# Patient Record
Sex: MALE | Race: WHITE | NOT HISPANIC OR LATINO | Employment: FULL TIME | URBAN - METROPOLITAN AREA
[De-identification: names, ages, dates, MRNs, and addresses within clinical notes are randomized per-mention and may not be internally consistent; named-entity substitution may affect disease eponyms.]

---

## 2019-06-21 ENCOUNTER — HOSPITAL ENCOUNTER (EMERGENCY)
Facility: HOSPITAL | Age: 54
Discharge: HOME | End: 2019-06-21
Attending: EMERGENCY MEDICINE

## 2019-06-21 VITALS
BODY MASS INDEX: 35.55 KG/M2 | TEMPERATURE: 98 F | OXYGEN SATURATION: 98 % | HEART RATE: 84 BPM | HEIGHT: 72 IN | DIASTOLIC BLOOD PRESSURE: 94 MMHG | RESPIRATION RATE: 18 BRPM | WEIGHT: 262.44 LBS | SYSTOLIC BLOOD PRESSURE: 130 MMHG

## 2019-06-21 DIAGNOSIS — Z76.0 MEDICATION REFILL: ICD-10-CM

## 2019-06-21 DIAGNOSIS — M1A.0710 CHRONIC GOUT OF RIGHT FOOT, UNSPECIFIED CAUSE: Primary | ICD-10-CM

## 2019-06-21 PROCEDURE — 63600175 PHARM REV CODE 636 W HCPCS: Performed by: REGISTERED NURSE

## 2019-06-21 PROCEDURE — 99284 EMERGENCY DEPT VISIT MOD MDM: CPT | Mod: 25

## 2019-06-21 PROCEDURE — 96372 THER/PROPH/DIAG INJ SC/IM: CPT

## 2019-06-21 RX ORDER — INDOMETHACIN 50 MG/1
CAPSULE ORAL
COMMUNITY

## 2019-06-21 RX ORDER — DEXAMETHASONE SODIUM PHOSPHATE 4 MG/ML
8 INJECTION, SOLUTION INTRA-ARTICULAR; INTRALESIONAL; INTRAMUSCULAR; INTRAVENOUS; SOFT TISSUE
Status: COMPLETED | OUTPATIENT
Start: 2019-06-21 | End: 2019-06-21

## 2019-06-21 RX ORDER — INDOMETHACIN 50 MG/1
50 CAPSULE ORAL DAILY
Qty: 30 CAPSULE | Refills: 1 | Status: SHIPPED | OUTPATIENT
Start: 2019-06-21

## 2019-06-21 RX ADMIN — DEXAMETHASONE SODIUM PHOSPHATE 8 MG: 4 INJECTION, SOLUTION INTRA-ARTICULAR; INTRALESIONAL; INTRAMUSCULAR; INTRAVENOUS; SOFT TISSUE at 12:06

## 2019-06-21 NOTE — ED PROVIDER NOTES
"     HISTORY     Chief Complaint   Patient presents with    Gout     " my gout is acting up and I ran out of my medicine"     Review of patient's allergies indicates:   Allergen Reactions    Codeine Hives    Hydrocodone Hives        HPI   The history is provided by the patient.   Gout attack to R foot, pt states that he ran out of his medication. Denies any fever, NVD, leg swelling, CP, SOB or any other symptoms at this time.      PCP: Primary Doctor No     Past Medical History:  History reviewed. No pertinent past medical history.     Past Surgical History:  Past Surgical History:   Procedure Laterality Date    HAND SURGERY      lipoma removal          Family History:  History reviewed. No pertinent family history.     Social History:  Social History     Tobacco Use    Smoking status: Never Smoker    Smokeless tobacco: Current User     Types: Chew   Substance and Sexual Activity    Alcohol use: No    Drug use: No    Sexual activity: Not on file         ROS   Review of Systems   Constitutional: Negative for fever.   HENT: Negative for sore throat.    Respiratory: Negative for shortness of breath.    Cardiovascular: Negative for chest pain.   Gastrointestinal: Negative for nausea.   Genitourinary: Negative for dysuria.   Musculoskeletal: Negative for back pain.        + R foot pain    Skin: Negative for rash.   Neurological: Negative for weakness.   Hematological: Does not bruise/bleed easily.   All other systems reviewed and are negative.      PHYSICAL EXAM     Initial Vitals   BP Pulse Resp Temp SpO2   06/21/19 1217 06/21/19 1217 06/21/19 1217 06/21/19 1219 06/21/19 1217   139/84 85 18 97.5 °F (36.4 °C) 98 %      MAP       --                  Physical Exam    Constitutional: He appears well-developed and well-nourished. No distress.   HENT:   Head: Normocephalic and atraumatic.   Nose: Nose normal.   Mouth/Throat: Oropharynx is clear and moist.   Eyes: Conjunctivae and EOM are normal. Pupils are equal, " round, and reactive to light.   Neck: Normal range of motion. Neck supple.   Cardiovascular: Normal rate and regular rhythm.   Pulmonary/Chest: Effort normal and breath sounds normal. No respiratory distress. He has no decreased breath sounds. He has no wheezes. He has no rales.   Abdominal: Soft. Normal appearance and bowel sounds are normal. There is no tenderness.   Musculoskeletal: Normal range of motion.        Right foot: There is tenderness.        Feet:    Erythema, warm and ttp over base of 2-3 toes on R foot, hx of gout to area   Neurological: He is alert and oriented to person, place, and time. He has normal strength. GCS eye subscore is 4. GCS verbal subscore is 5. GCS motor subscore is 6.   Skin: Skin is warm and dry. Capillary refill takes less than 2 seconds. No rash noted.   Psychiatric: He has a normal mood and affect. His speech is normal and behavior is normal.          ED COURSE   Procedures  ED ONGOING VITALS:  Vitals:    06/21/19 1217 06/21/19 1219 06/21/19 1305   BP: 139/84  (!) 130/94   Pulse: 85  84   Resp: 18  18   Temp:  97.5 °F (36.4 °C)    TempSrc: Oral Axillary    SpO2: 98%  98%   Weight: 119 kg (262 lb 7.3 oz)     Height: 6' (1.829 m)           ABNORMAL LAB VALUES:  Labs Reviewed - No data to display      ALL LAB VALUES:        RADIOLOGY STUDIES:  Imaging Results    None                   The above vital signs and test results have been reviewed by the emergency provider.     ED Medications:  Medications   dexamethasone injection 8 mg (8 mg Intramuscular Given 6/21/19 1259)         Discharge Medications:  Discharge Medication List as of 6/21/2019 12:42 PM      START taking these medications    Details   indomethacin (INDOCIN) 50 MG capsule Take 1 capsule (50 mg total) by mouth once daily., Starting Fri 6/21/2019, Print            Follow-up Information     [facility]    Specialty:  Emergency Medicine  Why:  If symptoms worsen  Contact information:  [address] [place] [address]  [place]  [facility] [address]  [phone]               12:42 PM: Discussed pt dx and plan of tx. Gave pt all f/u and return to the ED instructions. All questions and concerns were addressed at this time. Pt expresses understanding of information and instructions, and is comfortable with plan to discharge. Pt is stable for discharge.       I discussed with patient and/or family/caretaker that evaluation in the ED does not suggest any emergent or life threatening medical conditions requiring immediate intervention beyond what was provided in the ED, and I believe patient is safe for discharge. Regardless, an unremarkable evaluation in the ED does not preclude the development or presence of a serious or life threatening condition. As such, patient was instructed to return immediately for any worsening or change in current symptoms.    MEDICAL DECISION MAKING                 CLINICAL IMPRESSION       ICD-10-CM ICD-9-CM   1. Chronic gout of right foot, unspecified cause M1A.0710 274.02   2. Medication refill Z76.0 V68.1               Jony Miller Jr., [facility]  06/21/19 2037

## 2019-06-21 NOTE — ED NOTES
"Pt c/o 7/10 right toe pain x 30 days. Pt states, "I ran out of my gout medicine."     Patient identifiers verified and correct for Gm Gold Martins.    LOC: The patient is awake, alert and aware of environment with an appropriate affect, the patient is oriented x 3 and speaking appropriately.  APPEARANCE: Patient resting comfortably and in no acute distress, patient is clean and well groomed, patient's clothing is properly fastened.  SKIN: The skin is warm and dry, color consistent with ethnicity, patient has normal skin turgor and moist mucus membranes, skin intact, no breakdown or bruising noted.  MUSCULOSKELETAL: Patient moving all extremities spontaneously. Right toes tender to touch.   RESPIRATORY: Airway is open and patent, respirations are spontaneous.  CARDIAC: Patient has a normal rate, no periphreal edema noted, capillary refill < 3 seconds.  ABDOMEN: Soft and non tender to palpation.    "

## 2023-11-09 ENCOUNTER — OFFICE VISIT (OUTPATIENT)
Dept: URGENT CARE | Facility: CLINIC | Age: 58
End: 2023-11-09
Payer: COMMERCIAL

## 2023-11-09 VITALS
SYSTOLIC BLOOD PRESSURE: 122 MMHG | BODY MASS INDEX: 35.89 KG/M2 | WEIGHT: 265 LBS | RESPIRATION RATE: 20 BRPM | DIASTOLIC BLOOD PRESSURE: 70 MMHG | HEART RATE: 78 BPM | HEIGHT: 72 IN | TEMPERATURE: 98 F | OXYGEN SATURATION: 98 %

## 2023-11-09 DIAGNOSIS — Z87.39 HISTORY OF GOUT: ICD-10-CM

## 2023-11-09 DIAGNOSIS — M25.531 RIGHT WRIST PAIN: Primary | ICD-10-CM

## 2023-11-09 PROCEDURE — 96372 THER/PROPH/DIAG INJ SC/IM: CPT | Mod: S$GLB,,, | Performed by: FAMILY MEDICINE

## 2023-11-09 PROCEDURE — 96372 PR INJECTION,THERAP/PROPH/DIAG2ST, IM OR SUBCUT: ICD-10-PCS | Mod: S$GLB,,, | Performed by: FAMILY MEDICINE

## 2023-11-09 PROCEDURE — 99203 PR OFFICE/OUTPT VISIT, NEW, LEVL III, 30-44 MIN: ICD-10-PCS | Mod: 25,S$GLB,, | Performed by: PHYSICIAN ASSISTANT

## 2023-11-09 PROCEDURE — 99203 OFFICE O/P NEW LOW 30 MIN: CPT | Mod: 25,S$GLB,, | Performed by: PHYSICIAN ASSISTANT

## 2023-11-09 RX ORDER — PREDNISONE 20 MG/1
40 TABLET ORAL
Status: COMPLETED | OUTPATIENT
Start: 2023-11-09 | End: 2023-11-09

## 2023-11-09 RX ORDER — DEXAMETHASONE SODIUM PHOSPHATE 100 MG/10ML
10 INJECTION INTRAMUSCULAR; INTRAVENOUS ONCE
Status: COMPLETED | OUTPATIENT
Start: 2023-11-09 | End: 2023-11-09

## 2023-11-09 RX ORDER — KETOROLAC TROMETHAMINE 30 MG/ML
30 INJECTION, SOLUTION INTRAMUSCULAR; INTRAVENOUS
Status: COMPLETED | OUTPATIENT
Start: 2023-11-09 | End: 2023-11-09

## 2023-11-09 RX ORDER — INDOMETHACIN 75 MG/1
75 CAPSULE, EXTENDED RELEASE ORAL 2 TIMES DAILY WITH MEALS
Qty: 20 CAPSULE | Refills: 0 | OUTPATIENT
Start: 2023-11-09 | End: 2023-11-12

## 2023-11-09 RX ADMIN — DEXAMETHASONE SODIUM PHOSPHATE 10 MG: 100 INJECTION INTRAMUSCULAR; INTRAVENOUS at 05:11

## 2023-11-09 RX ADMIN — KETOROLAC TROMETHAMINE 30 MG: 30 INJECTION, SOLUTION INTRAMUSCULAR; INTRAVENOUS at 05:11

## 2023-11-09 RX ADMIN — PREDNISONE 40 MG: 20 TABLET ORAL at 05:11

## 2023-11-09 NOTE — PROGRESS NOTES
Subjective:      Patient ID: Gm Martins is a 58 y.o. male.    Vitals:  height is 6' (1.829 m) and weight is 120.2 kg (265 lb). His oral temperature is 98.1 °F (36.7 °C). His blood pressure is 122/70 and his pulse is 78. His respiration is 20 and oxygen saturation is 98%.     Chief Complaint: Hand Pain    Pt have a history of gout with his right foot, and  when he woke up this morning his right hand was swollen.Pt has pain right hand, and never had an injury to his right hand. Pt have concerns of his right hand now has gout.  He denies injury or trauma to right hand.    Hand Pain   The incident occurred 12 to 24 hours ago. The injury mechanism is unknown. The pain is present in the right hand. The quality of the pain is described as burning. Radiates to: Right elbow. The pain is at a severity of 10/10. The pain is severe. The pain has been Constant since the incident. Pertinent negatives include no chest pain, muscle weakness, numbness or tingling. The symptoms are aggravated by movement and lifting. Treatments tried: gout meds. The treatment provided no relief.       Cardiovascular:  Negative for chest pain.   Musculoskeletal:  Positive for pain, joint pain, joint swelling and gout.   Neurological:  Negative for numbness.      Objective:     Physical Exam   Constitutional: He is oriented to person, place, and time. He appears well-developed.   HENT:   Head: Normocephalic and atraumatic.   Ears:   Right Ear: External ear normal.   Left Ear: External ear normal.   Nose: Nose normal.   Mouth/Throat: Oropharynx is clear and moist.   Eyes: Conjunctivae, EOM and lids are normal. Pupils are equal, round, and reactive to light.   Neck: Trachea normal and phonation normal. Neck supple.   Cardiovascular: Normal rate.   Musculoskeletal: Normal range of motion.         General: Swelling and tenderness present. Normal range of motion.      Comments: Entire right hand is slightly swollen with most swelling at the ulnar  wrist portion.  In his mostly tender over the dorsum on a aspect of the hand  Distal sensory motor vascular intact.  Hand is only slightly warm.  There was no tracking up the arm erythema streaking.  There is no axillary adenopathy.   Neurological: He is alert and oriented to person, place, and time.   Skin: Skin is warm, dry and intact.   Psychiatric: His speech is normal and behavior is normal. Judgment and thought content normal.   Nursing note and vitals reviewed.  PATIENT COMES IN WITH A HISTORY OF GOUT RIGHT HAND/WRIST SWELLING VERY TENDER TO PALPATION CONSISTENT WITH GOUT.  THERE ARE NO OPEN SKIN LESIONS OR OTHER FINDINGS THAT COULD BE THE  CAUSE OF CELLULITIS BUT I AM % CERTAIN THIS IS GOUT ONLY.  I WILL TREAT HIM AS GOUT TONIGHT AND HE UNDERSTANDS TO RETURN TOMORROW IF SYMPTOMS WORSENING AT WHICH TIME ANTIBIOTICS WILL NEED TO BE PRESCRIBED    Assessment:     1. Right wrist pain    2. History of gout        Plan:       Right wrist pain  -     dexAMETHasone injection 10 mg  -     ketorolac injection 30 mg  -     predniSONE tablet 40 mg  -     indomethacin (INDOCIN SR) 75 mg CpSR CR capsule; Take 1 capsule (75 mg total) by mouth 2 (two) times daily with meals.  Dispense: 20 capsule; Refill: 0    History of gout  -     dexAMETHasone injection 10 mg  -     ketorolac injection 30 mg  -     predniSONE tablet 40 mg  -     indomethacin (INDOCIN SR) 75 mg CpSR CR capsule; Take 1 capsule (75 mg total) by mouth 2 (two) times daily with meals.  Dispense: 20 capsule; Refill: 0      Follow up if symptoms worsen or fail to improve, for F/U with PCP or ED.   Patient Instructions   RETURN TOMORROW MORNING IF HAND/ARM WERE MORE RED OR SWELLING IS WORSE OR PAIN IS WORSE

## 2023-11-12 ENCOUNTER — HOSPITAL ENCOUNTER (EMERGENCY)
Facility: HOSPITAL | Age: 58
Discharge: HOME | End: 2023-11-12
Attending: EMERGENCY MEDICINE
Payer: COMMERCIAL

## 2023-11-12 VITALS
WEIGHT: 282.88 LBS | TEMPERATURE: 98 F | HEART RATE: 75 BPM | OXYGEN SATURATION: 96 % | SYSTOLIC BLOOD PRESSURE: 155 MMHG | BODY MASS INDEX: 38.36 KG/M2 | RESPIRATION RATE: 20 BRPM | DIASTOLIC BLOOD PRESSURE: 90 MMHG

## 2023-11-12 DIAGNOSIS — Z87.39 HISTORY OF GOUT: ICD-10-CM

## 2023-11-12 DIAGNOSIS — M25.531 RIGHT WRIST PAIN: ICD-10-CM

## 2023-11-12 DIAGNOSIS — S69.91XA RIGHT WRIST INJURY: ICD-10-CM

## 2023-11-12 DIAGNOSIS — M25.531 WRIST PAIN, ACUTE, RIGHT: Primary | ICD-10-CM

## 2023-11-12 PROCEDURE — 99284 EMERGENCY DEPT VISIT MOD MDM: CPT

## 2023-11-12 PROCEDURE — 25000003 PHARM REV CODE 250: Performed by: REGISTERED NURSE

## 2023-11-12 PROCEDURE — 96372 THER/PROPH/DIAG INJ SC/IM: CPT | Mod: 59 | Performed by: REGISTERED NURSE

## 2023-11-12 PROCEDURE — 29125 APPL SHORT ARM SPLINT STATIC: CPT | Mod: RT

## 2023-11-12 PROCEDURE — 63600175 PHARM REV CODE 636 W HCPCS: Performed by: REGISTERED NURSE

## 2023-11-12 RX ORDER — TRAMADOL HYDROCHLORIDE 50 MG/1
50 TABLET ORAL EVERY 6 HOURS PRN
Qty: 12 TABLET | Refills: 0 | Status: SHIPPED | OUTPATIENT
Start: 2023-11-12

## 2023-11-12 RX ORDER — KETOROLAC TROMETHAMINE 30 MG/ML
60 INJECTION, SOLUTION INTRAMUSCULAR; INTRAVENOUS
Status: COMPLETED | OUTPATIENT
Start: 2023-11-12 | End: 2023-11-12

## 2023-11-12 RX ORDER — METHYLPREDNISOLONE 4 MG/1
TABLET ORAL
Qty: 1 EACH | Refills: 0 | Status: SHIPPED | OUTPATIENT
Start: 2023-11-12

## 2023-11-12 RX ORDER — TRAMADOL HYDROCHLORIDE 50 MG/1
50 TABLET ORAL
Status: COMPLETED | OUTPATIENT
Start: 2023-11-12 | End: 2023-11-12

## 2023-11-12 RX ORDER — TRAMADOL HYDROCHLORIDE 50 MG/1
50 TABLET ORAL EVERY 6 HOURS PRN
Qty: 12 TABLET | Refills: 0 | Status: SHIPPED | OUTPATIENT
Start: 2023-11-12 | End: 2023-11-12 | Stop reason: SDUPTHER

## 2023-11-12 RX ORDER — INDOMETHACIN 75 MG/1
75 CAPSULE, EXTENDED RELEASE ORAL 2 TIMES DAILY WITH MEALS
Qty: 20 CAPSULE | Refills: 0 | Status: SHIPPED | OUTPATIENT
Start: 2023-11-12 | End: 2023-11-12 | Stop reason: SDUPTHER

## 2023-11-12 RX ORDER — INDOMETHACIN 75 MG/1
75 CAPSULE, EXTENDED RELEASE ORAL 2 TIMES DAILY WITH MEALS
Qty: 20 CAPSULE | Refills: 0 | Status: SHIPPED | OUTPATIENT
Start: 2023-11-12

## 2023-11-12 RX ORDER — METHYLPREDNISOLONE 4 MG/1
TABLET ORAL
Qty: 1 EACH | Refills: 0 | Status: SHIPPED | OUTPATIENT
Start: 2023-11-12 | End: 2023-11-12 | Stop reason: SDUPTHER

## 2023-11-12 RX ADMIN — KETOROLAC TROMETHAMINE 60 MG: 30 INJECTION, SOLUTION INTRAMUSCULAR at 09:11

## 2023-11-12 RX ADMIN — TRAMADOL HYDROCHLORIDE 50 MG: 50 TABLET, COATED ORAL at 09:11

## 2023-11-12 NOTE — Clinical Note
"Gm "Brigitte Martins was seen and treated in our emergency department on 11/12/2023.  He may return to work on 11/14/2023.       If you have any questions or concerns, please don't hesitate to call.      Jony Miller Jr., FNP"

## 2023-11-13 NOTE — ED PROVIDER NOTES
Encounter Date: 11/12/2023       History     Chief Complaint   Patient presents with    Hand Pain     Right hand and wrist pain that radiates down arm to elbow since Friday. PMHx gout. Seen on Friday for same issue, given steroids. Reports working with tools yesterday when right hand began to swell again      58-year-old male presents emergency department complaints of right hand wrist pain that began 2 days ago.  Primary history of gout.  Patient was seen in urgent care on Friday and prescribed indomethacin which he has not picked up.  Patient was given Toradol and Decadron in the clinic and he states  symptoms did improve.  Patient reports swelling beginning again today.  Patient denies any fevers, chills, injury, chest pain or any other symptoms.    The history is provided by the patient.     Review of patient's allergies indicates:   Allergen Reactions    Codeine Hives    Hydrocodone Hives     No past medical history on file.  Past Surgical History:   Procedure Laterality Date    HAND SURGERY      lipoma removal       No family history on file.  Social History     Tobacco Use    Smoking status: Never    Smokeless tobacco: Current     Types: Chew   Substance Use Topics    Alcohol use: No    Drug use: No     Review of Systems   Constitutional:  Negative for fever.   HENT:  Negative for sore throat.    Respiratory:  Negative for shortness of breath.    Cardiovascular:  Negative for chest pain.   Gastrointestinal:  Negative for nausea.   Genitourinary:  Negative for dysuria.   Musculoskeletal:  Positive for arthralgias and joint swelling. Negative for back pain.   Skin:  Negative for rash.   Neurological:  Negative for weakness.   Hematological:  Does not bruise/bleed easily.   All other systems reviewed and are negative.      Physical Exam     Initial Vitals [11/12/23 2004]   BP Pulse Resp Temp SpO2   (!) 155/90 75 16 97.8 °F (36.6 °C) 96 %      MAP       --         Physical Exam    Constitutional: He appears  well-developed and well-nourished. No distress.   HENT:   Head: Normocephalic and atraumatic.   Nose: Nose normal.   Mouth/Throat: Uvula is midline and oropharynx is clear and moist.   Eyes: Conjunctivae and EOM are normal. Pupils are equal, round, and reactive to light.   Neck: Neck supple.   Normal range of motion.  Cardiovascular:  Normal rate and regular rhythm.           Pulmonary/Chest: Effort normal and breath sounds normal. No respiratory distress. He has no decreased breath sounds. He has no wheezes. He has no rales.   Abdominal: Abdomen is soft. Bowel sounds are normal. There is no abdominal tenderness.   Musculoskeletal:         General: Normal range of motion.      Right wrist: Swelling and tenderness present. No bony tenderness, snuff box tenderness or crepitus. Normal pulse.      Right hand: Swelling present. No tenderness. Normal range of motion. There is no disruption of two-point discrimination. Normal capillary refill. Normal pulse.      Cervical back: Normal range of motion and neck supple.      Comments:   Diffuse swelling and tenderness to the right wrist, no erythema, no induration, limited range of motion due to pain     Neurological: He is alert and oriented to person, place, and time. He has normal strength. GCS eye subscore is 4. GCS verbal subscore is 5. GCS motor subscore is 6.   Skin: Skin is warm and dry. Capillary refill takes less than 2 seconds. No rash noted.   Psychiatric: He has a normal mood and affect. His speech is normal and behavior is normal.         ED Course   Procedures  Labs Reviewed - No data to display       Imaging Results              X-Ray Wrist Complete Right (Final result)  Result time 11/12/23 20:52:14      Final result by Truong Murdock MD (11/12/23 20:52:14)                   Impression:      As above      Electronically signed by: Truong Murdock  Date:    11/12/2023  Time:    20:52               Narrative:    EXAMINATION:  XR WRIST COMPLETE 3 VIEWS  RIGHT    CLINICAL HISTORY:  Unspecified injury of right wrist, hand and finger(s), initial encounter    TECHNIQUE:  PA, lateral, and oblique views of the right wrist were performed.    COMPARISON:  None    FINDINGS:  No acute fracture or dislocation.  Degenerative joint disease.  Recommend follow-up if symptoms persist                                       Medications   ketorolac injection 60 mg (60 mg Intramuscular Given 11/12/23 2117)   traMADoL tablet 50 mg (50 mg Oral Given 11/12/23 2117)     Medical Decision Making  Amount and/or Complexity of Data Reviewed  Radiology: ordered.     Details:  Negative for acute fracture    Risk  Risk Details:  Place patient on steroid pack and tramadol.  Follow up with primary care in 3 days.  Wrist splint applied for comfort.                               Clinical Impression:   Final diagnoses:  [S69.91XA] Right wrist injury  [M25.531] Wrist pain, acute, right (Primary)        ED Disposition Condition    Discharge Stable          ED Prescriptions       Medication Sig Dispense Start Date End Date Auth. Provider    indomethacin (INDOCIN SR) 75 mg CpSR CR capsule  (Status: Discontinued) Take 1 capsule (75 mg total) by mouth 2 (two) times daily with meals. 20 capsule 11/12/2023 11/12/2023 Jony Miller Jr., FNP    methylPREDNISolone (MEDROL DOSEPACK) 4 mg tablet  (Status: Discontinued) Take as directed 1 each 11/12/2023 11/12/2023 Jony Miller Jr., ANTHONY    traMADoL (ULTRAM) 50 mg tablet  (Status: Discontinued) Take 1 tablet (50 mg total) by mouth every 6 (six) hours as needed for Pain. 12 tablet 11/12/2023 11/12/2023 Jony Miller Jr., FNP    indomethacin (INDOCIN SR) 75 mg CpSR CR capsule Take 1 capsule (75 mg total) by mouth 2 (two) times daily with meals. 20 capsule 11/12/2023 -- Jony Miller Jr., FNP    methylPREDNISolone (MEDROL DOSEPACK) 4 mg tablet Take as directed 1 each 11/12/2023 -- Jony Miller Jr., FNP    traMADoL (ULTRAM) 50 mg  tablet Take 1 tablet (50 mg total) by mouth every 6 (six) hours as needed for Pain. 12 tablet 11/12/2023 -- Jony Miller Jr., ANTHONY          Follow-up Information       Follow up With Specialties Details Why Contact Info    [facility] - Emergency Dept. Emergency Medicine  If symptoms worsen [id number] [facility] [address] [phone]             Jony Miller Jr., ANTHONY  11/12/23 3339

## 2023-12-15 ENCOUNTER — OFFICE VISIT (OUTPATIENT)
Dept: URGENT CARE | Facility: CLINIC | Age: 58
End: 2023-12-15
Payer: COMMERCIAL

## 2023-12-15 VITALS
HEIGHT: 72 IN | BODY MASS INDEX: 38.19 KG/M2 | OXYGEN SATURATION: 98 % | HEART RATE: 63 BPM | DIASTOLIC BLOOD PRESSURE: 85 MMHG | WEIGHT: 282 LBS | SYSTOLIC BLOOD PRESSURE: 136 MMHG | RESPIRATION RATE: 18 BRPM | TEMPERATURE: 98 F

## 2023-12-15 DIAGNOSIS — M10.9 ACUTE GOUT OF RIGHT WRIST, UNSPECIFIED CAUSE: Primary | ICD-10-CM

## 2023-12-15 PROCEDURE — 99213 OFFICE O/P EST LOW 20 MIN: CPT | Mod: 25,S$GLB,, | Performed by: PHYSICIAN ASSISTANT

## 2023-12-15 PROCEDURE — 96372 THER/PROPH/DIAG INJ SC/IM: CPT | Mod: S$GLB,,, | Performed by: PHYSICIAN ASSISTANT

## 2023-12-15 PROCEDURE — 96372 PR INJECTION,THERAP/PROPH/DIAG2ST, IM OR SUBCUT: ICD-10-PCS | Mod: S$GLB,,, | Performed by: PHYSICIAN ASSISTANT

## 2023-12-15 PROCEDURE — 99213 PR OFFICE/OUTPT VISIT, EST, LEVL III, 20-29 MIN: ICD-10-PCS | Mod: 25,S$GLB,, | Performed by: PHYSICIAN ASSISTANT

## 2023-12-15 RX ORDER — PREDNISONE 20 MG/1
40 TABLET ORAL DAILY
Qty: 10 TABLET | Refills: 0 | Status: SHIPPED | OUTPATIENT
Start: 2023-12-15 | End: 2023-12-20

## 2023-12-15 RX ORDER — DEXAMETHASONE SODIUM PHOSPHATE 100 MG/10ML
10 INJECTION INTRAMUSCULAR; INTRAVENOUS ONCE
Status: COMPLETED | OUTPATIENT
Start: 2023-12-15 | End: 2023-12-15

## 2023-12-15 RX ORDER — KETOROLAC TROMETHAMINE 30 MG/ML
30 INJECTION, SOLUTION INTRAMUSCULAR; INTRAVENOUS
Status: COMPLETED | OUTPATIENT
Start: 2023-12-15 | End: 2023-12-15

## 2023-12-15 RX ADMIN — DEXAMETHASONE SODIUM PHOSPHATE 10 MG: 100 INJECTION INTRAMUSCULAR; INTRAVENOUS at 10:12

## 2023-12-15 RX ADMIN — KETOROLAC TROMETHAMINE 30 MG: 30 INJECTION, SOLUTION INTRAMUSCULAR; INTRAVENOUS at 10:12

## 2023-12-15 NOTE — PROGRESS NOTES
Subjective:      Patient ID: Gm Martins is a 58 y.o. male.    Vitals:  height is 6' (1.829 m) and weight is 127.9 kg (282 lb). His oral temperature is 98.2 °F (36.8 °C). His blood pressure is 136/85 and his pulse is 63. His respiration is 18 and oxygen saturation is 98%.     Chief Complaint: Wrist Pain (Right)    58 year old male patient presenting with right wrist pain and swelling x 1 week.  Patient has a history of gout and has been taking indomethacin with no improvement    Wrist Pain   The pain is present in the right hand and right wrist. This is a new problem. The current episode started 1 to 4 weeks ago (1 week). There has been no history of extremity trauma. The problem occurs constantly. The problem has been gradually worsening. The quality of the pain is described as aching. The pain is at a severity of 10/10. The pain is severe. Associated symptoms include joint swelling, a limited range of motion and stiffness. Pertinent negatives include no fever, itching or joint locking. The symptoms are aggravated by activity. Treatments tried: goody powder. The treatment provided no relief. Family history includes gout. Family history does not include rheumatoid arthritis. His past medical history is significant for gout. There is no history of diabetes, osteoarthritis or rheumatoid arthritis.       Constitution: Negative for fever.   Musculoskeletal:  Positive for joint pain, joint swelling, abnormal ROM of joint and gout. Negative for trauma.      Objective:     Physical Exam   Constitutional: He is oriented to person, place, and time. He appears well-developed.   HENT:   Head: Normocephalic and atraumatic.   Ears:   Right Ear: External ear normal.   Left Ear: External ear normal.   Nose: Nose normal.   Mouth/Throat: Oropharynx is clear and moist.   Eyes: Conjunctivae, EOM and lids are normal. Pupils are equal, round, and reactive to light.   Neck: Trachea normal and phonation normal. Neck supple.    Neurological: He is alert and oriented to person, place, and time.   Skin: Skin is warm, dry and intact.   Psychiatric: His speech is normal and behavior is normal. Judgment and thought content normal.   Nursing note and vitals reviewed.      Assessment:     1. Acute gout of right wrist, unspecified cause        Plan:       Acute gout of right wrist, unspecified cause  -     ketorolac injection 30 mg  -     dexAMETHasone injection 10 mg  -     predniSONE (DELTASONE) 20 MG tablet; Take 2 tablets (40 mg total) by mouth once daily. for 5 days  Dispense: 10 tablet; Refill: 0    Follow up if symptoms worsen or fail to improve, for F/U with PCP or ED. There are no Patient Instructions on file for this visit.

## 2023-12-15 NOTE — LETTER
December 15, 2023      [facility]  [address], [address]  [place] [address]  Phone: [phone]  Fax: [phone]       Patient: Gm Martins   YOB: 1965  Date of Visit: 12/15/2023    To Whom It May Concern:    Maria Fernanda Martins  was at [facility] on 12/15/2023. The patient may return to work/school on 12/16/2023 with no restrictions. If you have any questions or concerns, or if I can be of further assistance, please do not hesitate to contact me.    Sincerely,    Dustin Horan PA

## 2023-12-20 ENCOUNTER — TELEPHONE (OUTPATIENT)
Dept: URGENT CARE | Facility: CLINIC | Age: 58
End: 2023-12-20
Payer: COMMERCIAL

## 2023-12-20 DIAGNOSIS — M10.9 ARTHRITIS OF LEFT WRIST DUE TO GOUT: Primary | ICD-10-CM

## 2023-12-20 RX ORDER — INDOMETHACIN 75 MG/1
75 CAPSULE, EXTENDED RELEASE ORAL 2 TIMES DAILY WITH MEALS
Qty: 20 CAPSULE | Refills: 1 | Status: SHIPPED | OUTPATIENT
Start: 2023-12-20

## 2023-12-20 NOTE — TELEPHONE ENCOUNTER
Patient called stating that he ran out of his gout medicine.  I will send a refill of indomethacin in for him